# Patient Record
Sex: MALE | Race: WHITE
[De-identification: names, ages, dates, MRNs, and addresses within clinical notes are randomized per-mention and may not be internally consistent; named-entity substitution may affect disease eponyms.]

---

## 2018-02-25 ENCOUNTER — HOSPITAL ENCOUNTER (EMERGENCY)
Dept: HOSPITAL 1 - ED | Age: 33
Discharge: HOME | End: 2018-02-25
Payer: SELF-PAY

## 2018-02-25 VITALS — SYSTOLIC BLOOD PRESSURE: 124 MMHG | DIASTOLIC BLOOD PRESSURE: 76 MMHG

## 2018-02-25 VITALS
WEIGHT: 229.99 LBS | HEIGHT: 67.99 IN | BODY MASS INDEX: 34.86 KG/M2 | WEIGHT: 229.99 LBS | BODY MASS INDEX: 34.86 KG/M2 | HEIGHT: 67.99 IN

## 2018-02-25 DIAGNOSIS — R41.82: Primary | ICD-10-CM

## 2018-02-25 DIAGNOSIS — F10.129: ICD-10-CM

## 2018-02-25 DIAGNOSIS — I10: ICD-10-CM

## 2020-11-05 ENCOUNTER — HOSPITAL ENCOUNTER (EMERGENCY)
Dept: HOSPITAL 26 - MED | Age: 35
Discharge: HOME | End: 2020-11-05
Payer: SELF-PAY

## 2020-11-05 VITALS — BODY MASS INDEX: 40.92 KG/M2 | HEIGHT: 68 IN | WEIGHT: 270 LBS

## 2020-11-05 VITALS — SYSTOLIC BLOOD PRESSURE: 131 MMHG | DIASTOLIC BLOOD PRESSURE: 71 MMHG

## 2020-11-05 VITALS — DIASTOLIC BLOOD PRESSURE: 68 MMHG | SYSTOLIC BLOOD PRESSURE: 118 MMHG

## 2020-11-05 DIAGNOSIS — G96.191: Primary | ICD-10-CM

## 2020-11-05 DIAGNOSIS — E66.9: ICD-10-CM

## 2024-10-07 NOTE — NUR
36 y/o male c/o neck pain 9/10 with palpation and movement of neck X1 week. Pt 
states present for 1 year and a half, but in the last week has grown. Pt states 
he went to [facility] and was told it was a cyst, had it 
cleaned, and prescribed abx. No relief since Sunday.



No PMH, RX, NKA
DR. DILLARD AT BEDSIDE EVALUATING PATIENT.
Dr. Brower at bedside with U/S.
PATIENT AMBULATED TO BED 6.
Patient discharged with v/s stable. Written and verbal after care instructions 
given and explained. 

Patient verbalized understanding. Ambulatory with steady gait. All questions 
addressed prior to discharge. Advised to follow up with PMD.
PNA
PNA